# Patient Record
Sex: FEMALE | Race: WHITE | NOT HISPANIC OR LATINO | URBAN - NONMETROPOLITAN AREA
[De-identification: names, ages, dates, MRNs, and addresses within clinical notes are randomized per-mention and may not be internally consistent; named-entity substitution may affect disease eponyms.]

---

## 2017-01-05 ENCOUNTER — COMMUNICATION - GICH (OUTPATIENT)
Dept: FAMILY MEDICINE | Facility: OTHER | Age: 57
End: 2017-01-05

## 2017-01-06 ENCOUNTER — OFFICE VISIT - GICH (OUTPATIENT)
Dept: FAMILY MEDICINE | Facility: OTHER | Age: 57
End: 2017-01-06

## 2017-01-06 ENCOUNTER — HISTORY (OUTPATIENT)
Dept: FAMILY MEDICINE | Facility: OTHER | Age: 57
End: 2017-01-06

## 2017-01-06 DIAGNOSIS — Z11.3 ENCOUNTER FOR SCREENING FOR INFECTIONS WITH PREDOMINANTLY SEXUAL MODE OF TRANSMISSION: ICD-10-CM

## 2017-01-06 DIAGNOSIS — E55.9 VITAMIN D DEFICIENCY: ICD-10-CM

## 2017-01-06 DIAGNOSIS — R19.7 DIARRHEA: ICD-10-CM

## 2017-01-06 DIAGNOSIS — N95.0 POSTMENOPAUSAL BLEEDING: ICD-10-CM

## 2017-01-06 DIAGNOSIS — Z00.00 ENCOUNTER FOR GENERAL ADULT MEDICAL EXAMINATION WITHOUT ABNORMAL FINDINGS: ICD-10-CM

## 2017-01-06 DIAGNOSIS — Z12.39 ENCOUNTER FOR OTHER SCREENING FOR MALIGNANT NEOPLASM OF BREAST: ICD-10-CM

## 2017-01-06 DIAGNOSIS — M25.50 PAIN IN JOINT: ICD-10-CM

## 2017-01-06 DIAGNOSIS — E78.5 HYPERLIPIDEMIA: ICD-10-CM

## 2017-01-06 DIAGNOSIS — F32.9 MAJOR DEPRESSIVE DISORDER, SINGLE EPISODE: ICD-10-CM

## 2017-01-06 LAB
C-REACTIVE PROTEIN - HISTORICAL: <1 MG/DL
ERYTHROCYTE [SEDIMENTATION RATE] IN BLOOD: 12 MM/HR
RHEUMATOID FACTOR - HISTORICAL: <14 IU/ML
TSH - HISTORICAL: 1.77 UIU/ML (ref 0.34–5.6)
VITAMIN D TOTAL - HISTORICAL: 24.1 NG/ML

## 2017-01-06 ASSESSMENT — PATIENT HEALTH QUESTIONNAIRE - PHQ9: SUM OF ALL RESPONSES TO PHQ QUESTIONS 1-9: 1

## 2017-01-08 ENCOUNTER — HISTORY (OUTPATIENT)
Dept: FAMILY MEDICINE | Facility: OTHER | Age: 57
End: 2017-01-08

## 2017-01-09 LAB
HBSAG CATEGORY - HISTORICAL: NONREACTIVE
HEPATITIS C ANTIBODY CATEGORY - HISTORICAL: NORMAL
HIV-1/HIV-2 ANTIBODY CATEGORY - HISTORICAL: NORMAL

## 2017-01-10 LAB
ANA INTERPRETATION: NEGATIVE
CCP ANTIBODY,IGG/IGA - HISTORICAL: <4.6 CU
IGA - HISTORICAL: 214 MG/DL (ref 61–356)
LYME SCREEN W/REFLEX WEST BLOT - HISTORICAL: POSITIVE
TREPONEMA PALLIDUM - HISTORICAL: NEGATIVE

## 2017-01-11 LAB — TISSUE TRANSGLUTAMINASE IGA - HISTORICAL: 2 CU

## 2017-01-12 LAB
LYME IGG WESTERN BLOT - HISTORICAL: NORMAL
LYME IGM WESTERN BLOT - HISTORICAL: NORMAL
LYME WESTERN BLOT INTERP IGG - HISTORICAL: NORMAL
LYME WESTERN BLOT INTERP IGM - HISTORICAL: NORMAL

## 2017-01-16 ENCOUNTER — COMMUNICATION - GICH (OUTPATIENT)
Dept: FAMILY MEDICINE | Facility: OTHER | Age: 57
End: 2017-01-16

## 2017-01-20 ENCOUNTER — HISTORY (OUTPATIENT)
Dept: RADIOLOGY | Facility: OTHER | Age: 57
End: 2017-01-20

## 2017-01-20 ENCOUNTER — AMBULATORY - GICH (OUTPATIENT)
Dept: LAB | Facility: OTHER | Age: 57
End: 2017-01-20

## 2017-01-20 ENCOUNTER — HOSPITAL ENCOUNTER (OUTPATIENT)
Dept: RADIOLOGY | Facility: OTHER | Age: 57
End: 2017-01-20
Attending: FAMILY MEDICINE

## 2017-01-20 DIAGNOSIS — Z12.39 ENCOUNTER FOR OTHER SCREENING FOR MALIGNANT NEOPLASM OF BREAST: ICD-10-CM

## 2017-01-20 DIAGNOSIS — N95.0 POSTMENOPAUSAL BLEEDING: ICD-10-CM

## 2017-01-20 DIAGNOSIS — R19.7 DIARRHEA: ICD-10-CM

## 2017-01-21 LAB
CAMPYLOBACTER EIA - HISTORICAL: NEGATIVE
SHIGA TOXIN 1 - HISTORICAL: NEGATIVE
SHIGA TOXIN 2 - HISTORICAL: NEGATIVE

## 2017-01-23 ENCOUNTER — AMBULATORY - GICH (OUTPATIENT)
Dept: FAMILY MEDICINE | Facility: OTHER | Age: 57
End: 2017-01-23

## 2017-01-23 ENCOUNTER — COMMUNICATION - GICH (OUTPATIENT)
Dept: FAMILY MEDICINE | Facility: OTHER | Age: 57
End: 2017-01-23

## 2017-01-23 DIAGNOSIS — N95.0 POSTMENOPAUSAL BLEEDING: ICD-10-CM

## 2017-01-23 DIAGNOSIS — N84.0 POLYP OF CORPUS UTERI: ICD-10-CM

## 2017-01-24 LAB
METHOD O&P - HISTORICAL: NORMAL
OVA/PARASITE EXAM - HISTORICAL: NORMAL

## 2017-01-27 ENCOUNTER — COMMUNICATION - GICH (OUTPATIENT)
Dept: FAMILY MEDICINE | Facility: OTHER | Age: 57
End: 2017-01-27

## 2018-01-02 NOTE — TELEPHONE ENCOUNTER
Patient Information     Patient Name MRAmber Clements [id number] Female 1960      Telephone Encounter by Dedra Mcgill at 2017 11:54 AM     Author:  Dedra Mcgill Service:  (none) Author Type:  (none)     Filed:  2017 11:55 AM Encounter Date:  2017 Status:  Signed     :  Dedra Mcgill            Per Valencia Minor MD appointment notes, she does not do labs before patients are seen.  Please notify the patient of this.  Dedra Mcgill [facility] ([facility])................ 2017 11:55 AM

## 2018-01-02 NOTE — ADDENDUM NOTE
Patient Information     Patient Name MRN Amber Vernon [id number] Female 1960      Addendum Note by Valencia Rucker MD at 2017 12:03 PM     Author:  Valencia Rucker MD Service:  (none) Author Type:  Physician     Filed:  2017 12:03 PM Encounter Date:  2017 Status:  Signed     :  Valencia Rucker MD (Physician)       Addended by: VALENCIA RUCKER on: 2017 12:03 PM        Modules accepted: Orders

## 2018-01-02 NOTE — PROGRESS NOTES
Patient Information     Patient Name MRN Sex Amber Torres [id number] Female 1960      Progress Notes by Valencia Minor MD at 2017  2:15 PM     Author:  Valencia Minor MD Service:  (none) Author Type:  Physician     Filed:  2017  1:16 PM Encounter Date:  2017 Status:  Signed     :  Valencia Minor MD (Physician)            SUBJECTIVE:    Amber Aparicio is a 56 y.o. female who presents for a physical and to reestablish care.    [place] then served in the peace [employer] in [employer] [facility] [facility] and [facility] [employer] [facility], then caregiving for her parents in the [facility].  Now has moved back to the area in May.    Has been having intestinal issues.  Was in [place] last fall.  Had alternating constipation and diarrhea.  Gas smells very strong.  Had been on some antibiotics for traveler's diarrhea while she was over there.  She was treated for a parasite in , which resolved.  Wonders if dairy or wheat related.      Has been having some joint pain and tingling in hands.  Some swelling at night as well.  No neck pain.  No swelling of joints or digits of hands.    Had some labs done through her employer recently.  Her lipids were noted to be high.  Exercises regularly.  Tries to eat pretty healthy.    She would like to have sexually transmitted infection screening today.  Her Pap smear is up to date.    HPI  I personally reviewed medications/allergies/history listed below:     No Known Allergies,   Family History       Problem   Relation Age of Onset     Heart Disease  Mother      valvular heart disease       Kidney disease  Mother      Heart Disease  Father      valvular heart disease and ischemic heart disease/valve replacement       Heart Disease  Brother      cardiomyopathy       Kidney disease  Brother      Good Health  Daughter      Good Health  Son      Hypertension  Mother      Alcohol/Drug  Father      Alcohol/Drug  Brother      Other  Brother      back issues        Psychiatric illness  Brother      depression       Psychiatric illness  Sister      depression       Heart Disease  Maternal Grandfather      Asthma  Son      Psychiatric illness  Son      depression       Asthma  Daughter      Psychiatric illness  Daughter      depression       Heart Disease  Maternal Uncle      Diabetes  Maternal Uncle      Heart Disease  Brother      hypertrophic cardiomyopathy,      ,   Current Outpatient Prescriptions on File Prior to Visit       Medication  Sig Dispense Refill     cyclobenzaprine (FLEXERIL) 10 mg tablet Take 1 tablet by mouth 3 times daily if needed for Muscle Spasm. 120 tablet 0     metroNIDAZOLE 0.75% vaginal (METROGEL VAGINAL) 0.75 % vaginal gel Insert 1 Applicatorful into the vagina at bedtime. 1 Tube 0     rizatriptan (MAXALT) 10 mg tablet Take 1 tablet by mouth every 2 hours if needed for Migraine. 12 tablet 1     sertraline (ZOLOFT) 100 mg tablet Take 1.5 tablets by mouth once daily. Needs 1 year supply, traveling [place] and [place] the [place] 550 tablet 0     No current facility-administered medications on file prior to visit.    ,   Past Medical History      Diagnosis   Date     Cervical dysplasia       LGSIL pap, colp negative      Diverticulosis       Other adjustment reaction with predominant disturbance of other emotions       Palpitations  2009     see cardiology consult       Palpitations       Unspecified asthma(493.90)       resolved     ,   Patient Active Problem List     Diagnosis  Code     Adjustment disorder with anxiety F43.22     Palpitations R00.2     Hyperlipidemia E78.5    and   Past Surgical History      Procedure  Laterality Date     Pr  delivery only       Vaginal delivery       Pr subtalar athroereisis       Colposcopy  2007     Social History     Social History        Marital status:       Spouse name: N/A     Number of children:  N/A     Years of education:  N/A     Occupational History   "    Not on file.     Social History Main Topics          Smoking status:   Passive Smoke Exposure - Never Smoker      Smokeless tobacco:   Never Used       Comment: In childhood home       Alcohol use   0.6 oz/week     1 Glasses of wine per week        Comment: infrequent        Drug use:   No      Sexual activity:   Yes      Partners:  Male      Birth control/ protection:  Post-menopausal, Condom      Other Topics   Concern      Service  No     Blood Transfusions  No     Caffeine Concern  No     Occupational Exposure  No     Hobby Hazards  No     Sleep Concern  No     Stress Concern  No     Weight Concern  No     Special Diet  No     Back Care  Yes     off and on back and neck pain, chiropractic care and PT      Exercise  Yes     4-5 days per week, walking and yoga, bike      Bike Helmet  Yes     Seat Belt  Yes     Self-Exams  Yes     Social History Narrative     , has current boyfriend.    Denies domestic violence concerns.    Served [employer] in [facility] [employer] [facility] and [employer] [facility].    Daughter lives with her.    Son lives in the [facility].        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                REVIEW OF SYSTEMS:  Review of Systems   All other systems reviewed and are negative.      OBJECTIVE:  /74  Temp 97.4  F (36.3  C) (Tympanic)  Ht 1.594 m (5' 2.75\")  Wt 54.2 kg (119 lb 6.4 oz)  LMP 01/23/2010  Breastfeeding? No  BMI 21.32 kg/m2    EXAM:   Physical Exam   Constitutional: She is oriented to person, place, and time and well-developed, well-nourished, and in no distress.   HENT:   Head: Normocephalic and atraumatic.   Right Ear: External ear normal.   Left Ear: External ear normal.   Nose: Nose normal.   Mouth/Throat: Oropharynx is clear and moist.   Eyes: Pupils are equal, round, and reactive to light. No scleral icterus.   Neck: Normal range of motion. Neck supple. No thyromegaly present. "   Cardiovascular: Normal rate, regular rhythm, normal heart sounds and intact distal pulses.  Exam reveals no gallop and no friction rub.    No murmur heard.  Pulmonary/Chest: Effort normal and breath sounds normal. No respiratory distress. She has no wheezes. She has no rales. She exhibits no tenderness.   Breast exam:  No masses palpable.  No skin changes, tethering or axillary lymphadenopathy.   Abdominal: Soft. Bowel sounds are normal.   Genitourinary: Vagina normal, uterus normal, cervix normal, right adnexa normal and left adnexa normal.   Genitourinary Comments: Pap deferred.  Wet prep & GC chlamydia completed.  Rectal exam showed normal sphincter tone without masses palpable.   Musculoskeletal: Normal range of motion. She exhibits no edema.   Lymphadenopathy:     She has no cervical adenopathy.   Neurological: She is alert and oriented to person, place, and time. She has normal reflexes. No cranial nerve deficit.   Tinel's and Phalen's negative.   Skin: Skin is warm and dry.   Psychiatric: Affect normal.   PHQ Depression Screen  Date of PHQ exam: 17  Over the last 2 weeks, how often have you been bothered by any of the following problems?  1. Little interest or pleasure in doing things: 0 - Not at all  2. Feeling down, depressed, or hopeless: 0 - Not at all  3. Trouble falling or staying asleep, or sleeping too much: 1 - Several days  4. Feeling tired or having little energy: 0 - Not at all  5. Poor appetite or overeatin - Not at all  6. Feeling bad about yourself - or that you are a failure or have let yourself or your family down: 0 - Not at all  7. Trouble concentrating on things, such as reading the newspaper or watching television: 0 - Not at all  8. Moving or speaking so slowly that other people could have noticed. Or the opposite - being so fidgety or restless that you have been moving around a lot more than usual: 0 - Not at all  9. Thoughts that you would be better off dead, or of hurting  yourself in some way: 0 - Not at all    PHQ-9 TOTAL SCORE: 1  Depression Severity Level: none  If any answers were positive, how difficult have these problems made it for you to do your work, take care of things at home, or get along with other people: not difficult at all             ASSESSMENT/PLAN:    ICD-10-CM    1. Health care maintenance Z00.00    2. Breast cancer screening Z12.39 XR MAMMO BILAT SCREENING   3. Screen for STD (sexually transmitted disease) Z11.3 GC CHLAMYDIA TRACH PROBE      ANTI HCV      HBSAG (HBS)      TREPONEMA PALLIDUM      WET PREP GENITAL      ANTI HIV 1/2      ANTI HCV      HBSAG (HBS)      TREPONEMA PALLIDUM      ANTI HIV 1/2      GC CHLAMYDIA TRACH PROBE      WET PREP GENITAL   4. Postmenopausal bleeding N95.0 US PELVIS COMPLETE TA AND TV   5. Diarrhea, unspecified type R19.7 STOOL CULTURE PANEL      CLOSTRIDIUM DIFFICILE TOXIN PCR      OVA / PARASITE EXAM      CRYPTOSPORIDIUM TEST      WBC LACTOFERRIN GIH FLH NAGA ONLY      TSH      CELIAC CASCADE PANEL      TSH      CELIAC CASCADE PANEL   6. Arthralgia, unspecified joint M25.50 RA QUANTITATIVE      CHRIST TEST      C-REACTIVE PROTEIN      SEDIMENTATION RATE      CYCLIC CITRULLINE PEPTIDE      LYME SCREEN W/REFLEX      RA QUANTITATIVE      CHRIST TEST      C-REACTIVE PROTEIN      SEDIMENTATION RATE      CYCLIC CITRULLINE PEPTIDE      LYME SCREEN W/REFLEX   7. Depression, unspecified depression type F32.9 sertraline (ZOLOFT) 100 mg tablet      VITAMIN D 25 (DEFICIENCY)      VITAMIN D 25 (DEFICIENCY)   8. Hyperlipidemia, unspecified hyperlipidemia type E78.5         Plan:    1.  Mammogram ordered.  Pap smear is up to date.  Colonoscopy is up to date.  Vaccines up to date.  2.  Mammogram as above.  3. Sexually transmitted infection screening completed today as above.  4.  Will obtain ultrasound of pelvis.  Consider embx based on results.  5.  Stool studies and other labs as above.  If work up normal, would recommend colonoscopy.  6.  Labs as  above.  7.  Refilled zoloft.  Check vitamin D level.  8.  She is going to try to eat a little healthier and would like to recheck lipids in about 6 months.  Valencia Minor MD ....................  1/8/2017   1:14 PM

## 2018-01-03 NOTE — TELEPHONE ENCOUNTER
Patient Information     Patient Name MRAmber Clements [id number] Female 1960      Telephone Encounter by Dedra Mcgill at 2017  3:01 PM     Author:  Dedra Mcgill Service:  (none) Author Type:  (none)     Filed:  2017  3:03 PM Encounter Date:  2017 Status:  Signed     :  Dedra Mcgill            Left message to call back.  Dedra Mcgill CMA ([facility])................ 2017 3:03 PM

## 2018-01-03 NOTE — TELEPHONE ENCOUNTER
Patient Information     Patient Name MRN Amber Vernon [id number] Female 1960      Telephone Encounter by Adina Fish at 2017  3:12 PM     Author:  Adina Fish Service:  (none) Author Type:  (none)     Filed:  2017  3:13 PM Encounter Date:  2017 Status:  Signed     :  Adina Fihs            Patient was notified that the notes were faxed and the Images were pushed.  She will call to schedule her appointment.  Adina Fish ....................  2017   3:13 PM

## 2018-01-03 NOTE — TELEPHONE ENCOUNTER
Patient Information     Patient Name MRN Amber Vernon [id number] Female 1960      Telephone Encounter by Dedra Mcgill at 2017 11:00 AM     Author:  Dedra Mcgill Service:  (none) Author Type:  (none)     Filed:  2017 11:01 AM Encounter Date:  2017 Status:  Signed     :  Dedra Mcgill            This patient was called by reception and labs were done after her physical.  Dedra Mcgill CMA ([facility])................ 2017 11:01 AM

## 2018-01-03 NOTE — TELEPHONE ENCOUNTER
Patient Information     Patient Name MRN Amber Vernon [id number] Female 1960      Telephone Encounter by Isidra Chatman at 2017 12:20 PM     Author:  Isidra Chatman Service:  (none) Author Type:  (none)     Filed:  2017 12:22 PM Encounter Date:  2017 Status:  Signed     :  Isidra Chatman            Patient returned call and was given information below. Patient was also wondering if Valencia Minor MD could help her fill out a POLST.   Isidra Chatman LPN 2017 12:22 PM

## 2018-01-03 NOTE — TELEPHONE ENCOUNTER
Patient Information     Patient Name Amber Brown [id number] Female 1960      Telephone Encounter by Dedra Mcgill at 2017  9:16 AM     Author:  Dedra Mcgill Service:  (none) Author Type:  (none)     Filed:  2017  9:17 AM Encounter Date:  2017 Status:  Signed     :  Dedra Mcgill            After birth date was verified, patient was read her lab letter from 17.  She had a couple questions about the Lyme tests that I answered for her.  She does not think she wants to take the doxycyline at this time.  She will think about it.  Dedra Mcgill CMA ([facility])................ 2017 9:17 AM

## 2018-01-03 NOTE — TELEPHONE ENCOUNTER
Patient Information     Patient Name MRAmber Clements [id number] Female 1960      Telephone Encounter by Clarita Rodriguez at 2017  3:21 PM     Author:  Clarita Rodriguez Service:  (none) Author Type:  (none)     Filed:  2017  3:21 PM Encounter Date:  2017 Status:  Signed     :  Clarita Rodriguez            Patient returned nurse's call. Please call back when possible.    Clarita Rodriguez ....................  2017   3:21 PM

## 2018-01-03 NOTE — TELEPHONE ENCOUNTER
Patient Information     Patient Name MRN Amber Vernon [id number] Female 1960      Telephone Encounter by Selma Fisher at 2017 12:48 PM     Author:  Selma Fisher Service:  (none) Author Type:  (none)     Filed:  2017 12:49 PM Encounter Date:  2017 Status:  Signed     :  Selma Fisher            Patient states had a polst for both parents and was wanting it added to her 5 wishes. Patient will drop one off possibly or discuss in a follow up appointment.  Selma Fisher LPN .............2017  12:49 PM

## 2018-01-03 NOTE — TELEPHONE ENCOUNTER
Patient Information     Patient Name MRAmber Clements [id number] Female 1960      Telephone Encounter by Selma Fisher at 2017 10:58 AM     Author:  Selma Fisher Service:  (none) Author Type:  (none)     Filed:  2017 10:58 AM Encounter Date:  2017 Status:  Signed     :  Selma Fisher            ----- Message from Valencia Minor MD sent at 2017 10:43 AM CST -----  Please call - her pelvic ultrasound showed that the thickness of the lining of her uterus is normal.  However, there are possibly polyps seen.  Radiology recommended that she have an endometrial biopsy.  Polyps are benign, but sometimes need to be removed to help eliminate post-menopausal bleeding.  Would recommend referral to GYN for further discussion.  Order has been placed.  Valencia Minor MD ....................  2017   10:43 AM

## 2018-01-03 NOTE — TELEPHONE ENCOUNTER
Patient Information     Patient Name Amber Brown [id number] Female 1960      Telephone Encounter by Negra Mondragon at 2017 12:52 PM     Author:  Negra Mondragon Service:  (none) Author Type:  (none)     Filed:  2017 12:53 PM Encounter Date:  2017 Status:  Signed     :  Negra Mondragon            Could you please send the referral and lab results to Dr. Moyer's office in [place] and then let Amber know when this is completed so she can make an appointment there. Thank you.    Negra Mondragon LPN............................... 2017 12:53 PM

## 2018-01-03 NOTE — TELEPHONE ENCOUNTER
Patient Information     Patient Name MRHOWIE Sex Amber Torres [id number] Female 1960      Telephone Encounter by Negra Mondragon at 2017  9:51 AM     Author:  Negra Mondragon Service:  (none) Author Type:  (none)     Filed:  2017  9:53 AM Encounter Date:  2017 Status:  Signed     :  Negra Mondragon            Amber is calling because she would like a referral to see Dr. Natasha Atkins at [facility] in [place] for OBGYN. She states she is unhappy with the process here at [facility] and she is uncomfortable with a male doctor. Please place referral.  Negra Mondragon LPN............................... 2017 9:53 AM

## 2018-01-03 NOTE — TELEPHONE ENCOUNTER
Patient Information     Patient Name MRN Amber Vernon [id number] Female 1960      Telephone Encounter by Valencia Minor MD at 2017 12:27 PM     Author:  Valencia Minor MD Service:  (none) Author Type:  Physician     Filed:  2017 12:28 PM Encounter Date:  2017 Status:  Signed     :  Valencia Minor MD (Physician)            Ok to refer her to Dr. Moyer in [place].  The referral had already been placed for GYN.  Ok to schedule in [place] instead.  Valencia Minor MD ....................  2017   12:28 PM

## 2018-01-03 NOTE — TELEPHONE ENCOUNTER
Patient Information     Patient Name MRAmber Clements [id number] Female 1960      Telephone Encounter by Valencia Minor MD at 2017 12:30 PM     Author:  Valencia Minor MD Service:  (none) Author Type:  Physician     Filed:  2017 12:33 PM Encounter Date:  2017 Status:  Signed     :  Valencia Minor MD (Physician)            What kind of help does she need with a POLST?  Usually these are the DNR/DNI forms we fill out for patients who are in nursing homes.  Does she mean a living will?  Valencia Minor MD ....................  2017   12:31 PM

## 2018-01-03 NOTE — PROGRESS NOTES
Patient Information     Patient Name MRN Amber Vernon [id number] Female 1960      Progress Notes by Neena Bejarano R.T. ([facility]) at 2017  3:18 PM     Author:  [facility] Service:  (none) Author Type:  [employer] - Registered Radiologic Technologist     Filed:  2017  3:18 PM Date of Service:  2017  3:18 PM Status:  Signed     :  Neena Bejarano R.T. (ARRT) ([facility] - Registered Radiologic Technologist)            Falls Risk Criteria:    Age 65 and older or under age 4        Sensory deficits    Poor vision    Use of ambulatory aides    Impaired judgment    Unable to walk independently    Meets High Risk criteria for falls:  no

## 2018-01-03 NOTE — PROGRESS NOTES
Patient Information     Patient Name MRN Sex Amber Torres [id number] Female 1960      Progress Notes by Ran[facility] at 2017  3:08 PM     Author:  Ranjana Prabhakar R.T. ([facility]RT) Service:  (none) Author Type:  (none)     Filed:  2017  3:08 PM Date of Service:  2017  3:08 PM Status:  Signed     :  Ranjana Prabhakar R.T. (JUNET) ([facility] - Registered Radiologic Technologist)            Falls Risk Criteria:    Age 65 and older or under age 4        Sensory deficits    Poor vision    Use of ambulatory aides    Impaired judgment    Unable to walk independently    Meets High Risk criteria for falls:  no

## 2018-01-03 NOTE — TELEPHONE ENCOUNTER
Patient Information     Patient Name MRAmber Clements [id number] Female 1960      Telephone Encounter by Selma Fisher at 2017 10:58 AM     Author:  Selma Fisher Service:  (none) Author Type:  (none)     Filed:  2017 10:59 AM Encounter Date:  2017 Status:  Signed     :  Selma Fisher            Left message to call back.  Selma Fisher LPN ....................  2017   10:58 AM

## 2018-01-16 PROBLEM — E78.5 HYPERLIPIDEMIA: Status: ACTIVE | Noted: 2017-01-08

## 2018-01-16 RX ORDER — CYCLOBENZAPRINE HCL 10 MG
10 TABLET ORAL
COMMUNITY
Start: 2010-08-17

## 2018-01-16 RX ORDER — METRONIDAZOLE 7.5 MG/G
1 GEL VAGINAL
COMMUNITY
Start: 2011-09-30 | End: 2024-01-19

## 2018-01-16 RX ORDER — RIZATRIPTAN BENZOATE 10 MG/1
10 TABLET ORAL
COMMUNITY
Start: 2010-03-04

## 2018-01-16 RX ORDER — UBIDECARENONE 50 MG
1 CAPSULE ORAL
COMMUNITY
Start: 2017-01-02 | End: 2024-01-19

## 2018-01-16 RX ORDER — SERTRALINE HYDROCHLORIDE 100 MG/1
150 TABLET, FILM COATED ORAL
COMMUNITY
Start: 2010-08-06 | End: 2024-01-19

## 2018-01-25 VITALS
SYSTOLIC BLOOD PRESSURE: 114 MMHG | HEIGHT: 63 IN | WEIGHT: 119.4 LBS | TEMPERATURE: 97.4 F | BODY MASS INDEX: 21.16 KG/M2 | DIASTOLIC BLOOD PRESSURE: 74 MMHG

## 2018-01-27 ASSESSMENT — PATIENT HEALTH QUESTIONNAIRE - PHQ9: SUM OF ALL RESPONSES TO PHQ QUESTIONS 1-9: 1

## 2018-01-29 ENCOUNTER — COMMUNICATION - GICH (OUTPATIENT)
Dept: FAMILY MEDICINE | Facility: OTHER | Age: 58
End: 2018-01-29

## 2018-01-29 DIAGNOSIS — F32.9 MAJOR DEPRESSIVE DISORDER, SINGLE EPISODE: ICD-10-CM

## 2018-02-13 NOTE — TELEPHONE ENCOUNTER
Patient Information     Patient Name MRN Amber Vernon [id number] Female 1960      Telephone Encounter by Valencia Minor MD at 2018 11:40 AM     Author:  Valencia Minor MD Service:  (none) Author Type:  Physician     Filed:  2018 11:41 AM Encounter Date:  2018 Status:  Signed     :  Valencia Minor MD (Physician)            zoloft refilled.  Valencia Minor MD ....................  2018   11:40 AM

## 2018-02-13 NOTE — TELEPHONE ENCOUNTER
Patient Information     Patient Name MRN Amber Vernon [id number] Female 1960      Telephone Encounter by Farnaz Paul RN at 2018 10:50 AM     Author:  Farnaz Paul RN Service:  (none) Author Type:  NURS- Registered Nurse     Filed:  2018 10:59 AM Encounter Date:  2018 Status:  Signed     :  Farnaz Paul RN (NURS- Registered Nurse)            This is a Refill request from: [facility]   Name of Medication: Sertraline (ZOLOFT) 100 mg tablet  Quantity requested: 135   Last fill date: 2017  Due for refill: yes   Last visit with CLAUDIO RUCKER was on: 2017. Reminder letter sent.  Controlled Substance Agreement:  na   Diagnosis r/t this medication request: Depression     Unable to complete prescription refill per RN Medication Refill Policy.................... Farnaz Paul RN ....................  2018   10:52 AM

## 2018-03-24 ENCOUNTER — HEALTH MAINTENANCE LETTER (OUTPATIENT)
Age: 58
End: 2018-03-24

## 2018-07-23 NOTE — PROGRESS NOTES
"Patient Information     Patient Name  Amber Aparicio MRN  [id number] Sex  Female   1960      Letter by Valencia Minor MD at      Author:  Valencia Minor MD Service:  (none) Author Type:  (none)    Filed:   Encounter Date:  2017 Status:  (Other)           Amber Aparicio  [address] [facility] [address]          2017    Dear Ms. Aparicio:    Welcome to [facility]!   Remember to activate your account quickly -  Your activation code expires in 45 days!    With [facility], you can view your health information, email your provider, schedule clinic appointments, get after visit instructions and more - online, anytime.     To activate your [facility] account, you will need:    * to visit [email or web address]  * your [facility] activation code: [email or web address]  Expires: 2017  2:41 PM   * the last four digits of your Social Security number (SSN)   * your date of birth.     Step-by-step instructions on how to set up your [facility] account are shown on the following page. If you requested access to your child/dependent s [facility] account or you have been granted access to another adult s [facility] account, instructions for viewing their information are also on the following page.     For questions or technical assistance, call [phone].    Thank you for choosing [facility] activation instructions     Activation code: [email or web address]  Expires: 2017  2:41 PM     Step 1: Go to [email or web address].     Step 2: Click on Enter your activation code.     Step 3: Type in your activation code (provided above), the last four digits of your Social Security number (SSN) and date of birth. This information is only required once. The next time you sign in to your account you will only need your username and password. If you receive an error that states \"Invalid Social Security number  or  Invalid date of birth\" that means the information we have on file does not match the " information entered. Please call [phone] for assistance.     Step 4: Choose a username that is easy for you to remember, but hard for others to guess. Your username must:   * be between five and 24 characters   * contain only letters and numbers (no symbols).   Once selected, your username cannot be changed.     Step 5: Choose a password. Your password must:   * be at least eight characters   * contain at least one uppercase letter   * contain one lowercase letter   * contain one number or symbol   * be different than your username.     Step 6: Choose a security question. This will allow you to reset your password.     Step 7: Enter the answer to your security question. The answer cannot be the same as your password.     Step 8: Enter your email address. You will receive email notifications when new information is available in [facility]. You are now ready to start using [facility]!     If you requested proxy access for a child s or another adult s [facility] account, you will be able to access their health record by clicking on their name    Instructions for Child/Dependent Access  To view your child s record, click on your child's name under your name on the right hand side of the screen.   Instructions for Adult Proxy Access  To view your adult proxy record, click on the adult's name under your name on the right hand side of the screen.    In the event you have technical difficulties, please call   [facility] at [phone].

## 2018-07-23 NOTE — PROGRESS NOTES
Patient Information     Patient Name  Amber Aparicio MRN  [id number] Sex  Female   1960      Letter by Valencia Minor MD at      Author:  Valencia Minor MD Service:  (none) Author Type:  (none)    Filed:   Encounter Date:  2018 Status:  (Other)           Amber Aparicio   [address]  [facility] [address]          2018    Dear Ms. Aparicio:    This is to remind you that you are due for your annual office visit with Valencia Minor MD. Your last visit was on 2017.     Additional refills of your medication require you to complete this visit.    Please call [phone] to schedule your appointment.    Thank you for choosing [facility] for your health care needs.    Sincerely,      Refill RN  [facility]

## 2018-07-24 NOTE — PROGRESS NOTES
Patient Information     Patient Name  Amber Aparicio MRN  [id number] Sex  Female   1960      Letter by Valencia Minor MD at      Author:  Valencia Minor MD Service:  (none) Author Type:  (none)    Filed:   Encounter Date:  2017 Status:  (Other)           Amber Aparicio  [address] [facility] [address]          2017    Dear Ms. Aparicio:    I wanted to let you know about your recent labs.  Your Rheumatoid Factor & CCP (screen for rheumatoid arthritis) were both normal.  Your Anti-nuclear antibody (screens for autoimmune diseases) was normal. Your CRP and ESR, which are both markers for inflammation, were both normal.      Your screening test for celiac disease (IGA & tissue transglutaminase) was normal.    The Hepatitis B, Hepatitis C, HIV, Treponema Pallidum (syphilis), Gonorrhea/chlamydia, wet prep were all normal.      Your TSH (thyroid) was normal.    Your vitamin D level was slightly low.  I would recommend taking over the counter Vitamin D 5000 International Units daily.  Consider having this level rechecked again in 3 months.    Your Lyme test initially was positive, but the Western Blot to confirm whether you truly have Lyme disease was negative.  To err on the side of caution, I would recommend taking doxycycline 100 mg twice daily x 3 weeks.  I have sent a prescription for this to your pharmacy.  If you have questions, please call [phone].      Sincerely,      Valencia Minor MD       Resulted Orders      RA QUANTITATIVE (Collected: 2017  3:49 PM)      Result  Value Ref Range    RHEUMATOID FACTOR QUANT. <14.0 <14.0 IU/mL    Narrative      The presence of RF in patient serum should be  considered as one criterion of rheumatoid disease,  and not as a definitive diagnosis.  Results  >14 IU/mL are considered positive for RF.     ANTI HCV (Collected: 2017  3:49 PM)      Result  Value Ref Range    HEPATITIS C ANTIBODY Non-Reactive Non-Reactive    Narrative       Antibodies to HCV not detected; does not exclude the possibility of exposure to HCV.     HBSAG (HBS) (Collected: 1/6/2017  3:49 PM)      Result  Value Ref Range    HBSAG Nonreactive Nonreactive   TREPONEMA PALLIDUM (Collected: 1/6/2017  3:49 PM)      Result  Value Ref Range    TREPONEMA PALLIDUM Negative Negative   ANTI HIV 1/2 (Collected: 1/6/2017  3:49 PM)      Result  Value Ref Range    HIV-1/HIV-2 ANTIBODY Non-Reactive Non-Reactive    Narrative      HIV-1 p24 and HIV-1/HIV-2 Ab not detected     VITAMIN D 25 (DEFICIENCY) (Collected: 1/6/2017  3:49 PM)      Result  Value Ref Range    VITAMIN D TOTAL AFL 24.1   ng/mL    Narrative      Deficiency:           <10 ng/mL  Insufficiency:      10-29 ng/mL    Sufficiency:        ng/mL    Possible Toxicity:   >100 ng/mL       CHRIST TEST (Collected: 1/6/2017  3:49 PM)      Result  Value Ref Range    ANTINUCLEAR ANTIBODY (CHRIST),SCREEN Negative Negative   C-REACTIVE PROTEIN (Collected: 1/6/2017  3:49 PM)      Result  Value Ref Range    C-REACTIVE PROTEIN <1.000 <1.000 mg/dL   SEDIMENTATION RATE (Collected: 1/6/2017  3:49 PM)      Result  Value Ref Range    SEDIMENTATION RATE        12 <31 mm/hr   CYCLIC CITRULLINE PEPTIDE (Collected: 1/6/2017  3:51 PM)      Result  Value Ref Range    CCP Antibody,IgG/IgA <4.6 <=19.9 CU    Narrative      Negative <20  Positive >=20  The following results were obtained with the [employer] QUANTA Flash CCP3 chemiluminescent immunoassay. Values obtained with different manufacturers' assay methods may not be used interchangeably.       LYME SCREEN W/REFLEX (Collected: 1/6/2017  3:49 PM)      Result  Value Ref Range    LYME SCREEN W/REFLEX WEST BLOT Positive (A) Negative   TSH (Collected: 1/6/2017  3:49 PM)      Result  Value Ref Range    TSH 1.77 0.34 - 5.60 uIU/mL   CELIAC CASCADE PANEL (Collected: 1/6/2017  3:49 PM)      Result  Value Ref Range    .00 61.00 - 356.00 mg/dL    Narrative      Reflexed to Tissue Transglutaminase IgA     GC  CHLAMYDIA TRACH PROBE (Collected: 1/6/2017  3:48 PM)      Result  Value Ref Range    CHLAMYDIA PCR NOT Detected NOT Detected, Invalid    N GONORRHOEAE PCR NOT Detected NOT Detected, Invalid   WET PREP GENITAL (Collected: 1/6/2017  3:48 PM)      Result  Value Ref Range    TRICHOMONAS               None Seen None Seen    YEAST                     None Seen None Seen    CLUE CELLS                None Seen None Seen   TISSUE TRANSGLUTAMINASE IGA (Collected: 1/6/2017  3:49 PM)      Result  Value Ref Range    TISSUE TRANSGLUTAMINASE IGA 2.0 <=19.9 CU    Narrative      Negative        <20  Weak Positive   20-30  Positive        >30.0     This test should not be solely relied upon to establish a diagnosis of celiac disease. Affected individuals who have been on a gluten-free diet prior to testing may have a negative result. These results were obtained using the [employer] Quanta Flash h-tTG IgA chemiluminescent immunoassay.  Values obtained from other manufacturers' assay methods may not be used interchangeably.     LYME DISEASE GAIL CONF (Collected: 1/6/2017  3:49 PM)      Result  Value Ref Range    IGG WESTERN BLOT No Bands Negative    IGM WESTERN BLOT No Bands Negative    WESTERN BLOT INTERP IGG Negative, no bands present Negative    WESTERN BLOT INTERP IGM Negative, no bands present Negative

## 2023-11-20 ENCOUNTER — TELEPHONE (OUTPATIENT)
Dept: FAMILY MEDICINE | Facility: OTHER | Age: 63
End: 2023-11-20

## 2023-11-20 NOTE — TELEPHONE ENCOUNTER
Reason for Call:  Appointment Request    Patient requesting this type of appt:  Establish Care    Requested provider:  Dr. Joseph    Reason patient unable to be scheduled:       When does patient want to be seen/preferred time:       Comments: Dr. Joseph Pt states you used to see her parents- Edwin and Halle Lim    Could we send this information to you in [facility] or would you prefer to receive a phone call?:   Patient would prefer a phone call   Okay to leave a detailed message?: Yes at Home number on file [phone] (home)    Call taken on 11/20/2023 at 3:02 PM by Teresa Dia

## 2023-11-21 NOTE — TELEPHONE ENCOUNTER
Patient states that she requires a provider visit with Dr. Joseph to review testing and medical history from the past couple months. Patient states that she has seen many specialists in the past couple months, including ENT, pulmonology, GI and cardiology.     Patient expresses surprise that soonest available appt is in 01/2024, writer advised that Dr. Joseph is not taking new patients at this time as she has very limited availability. Patient expressed verbal understanding and requested soonest available appt, appt made:  1/22/2024 7:00 AM (Arrive by 6:45 AM) Maurice Joseph MD [facility]     Claudia Lares RN  -[facility]

## 2024-01-07 ENCOUNTER — HEALTH MAINTENANCE LETTER (OUTPATIENT)
Age: 64
End: 2024-01-07

## 2024-01-19 ENCOUNTER — OFFICE VISIT (OUTPATIENT)
Dept: FAMILY MEDICINE | Facility: CLINIC | Age: 64
End: 2024-01-19
Payer: COMMERCIAL

## 2024-01-19 VITALS
RESPIRATION RATE: 16 BRPM | SYSTOLIC BLOOD PRESSURE: 101 MMHG | DIASTOLIC BLOOD PRESSURE: 65 MMHG | WEIGHT: 147 LBS | BODY MASS INDEX: 27.05 KG/M2 | HEART RATE: 67 BPM | TEMPERATURE: 98.4 F | OXYGEN SATURATION: 97 % | HEIGHT: 62 IN

## 2024-01-19 DIAGNOSIS — E55.9 VITAMIN D DEFICIENCY: ICD-10-CM

## 2024-01-19 DIAGNOSIS — K21.00 GASTROESOPHAGEAL REFLUX DISEASE WITH ESOPHAGITIS WITHOUT HEMORRHAGE: ICD-10-CM

## 2024-01-19 DIAGNOSIS — R42 DIZZINESS: ICD-10-CM

## 2024-01-19 DIAGNOSIS — E04.1 THYROID NODULE: ICD-10-CM

## 2024-01-19 DIAGNOSIS — Z12.4 CERVICAL CANCER SCREENING: ICD-10-CM

## 2024-01-19 DIAGNOSIS — R06.02 SHORTNESS OF BREATH: ICD-10-CM

## 2024-01-19 DIAGNOSIS — Z00.00 ROUTINE GENERAL MEDICAL EXAMINATION AT A HEALTH CARE FACILITY: Primary | ICD-10-CM

## 2024-01-19 DIAGNOSIS — M81.0 AGE-RELATED OSTEOPOROSIS WITHOUT CURRENT PATHOLOGICAL FRACTURE: ICD-10-CM

## 2024-01-19 DIAGNOSIS — F43.22 ADJUSTMENT DISORDER WITH ANXIETY: ICD-10-CM

## 2024-01-19 DIAGNOSIS — D70.8 OTHER NEUTROPENIA (H): ICD-10-CM

## 2024-01-19 DIAGNOSIS — R00.2 PALPITATIONS: ICD-10-CM

## 2024-01-19 DIAGNOSIS — E78.5 HYPERLIPIDEMIA LDL GOAL <100: ICD-10-CM

## 2024-01-19 DIAGNOSIS — S32.19XD: ICD-10-CM

## 2024-01-19 LAB
ERYTHROCYTE [DISTWIDTH] IN BLOOD BY AUTOMATED COUNT: 12.4 % (ref 10–15)
HCT VFR BLD AUTO: 38.1 % (ref 35–47)
HGB BLD-MCNC: 12 G/DL (ref 11.7–15.7)
MCH RBC QN AUTO: 29.6 PG (ref 26.5–33)
MCHC RBC AUTO-ENTMCNC: 31.5 G/DL (ref 31.5–36.5)
MCV RBC AUTO: 94 FL (ref 78–100)
PLATELET # BLD AUTO: 243 10E3/UL (ref 150–450)
RBC # BLD AUTO: 4.05 10E6/UL (ref 3.8–5.2)
WBC # BLD AUTO: 2.8 10E3/UL (ref 4–11)

## 2024-01-19 PROCEDURE — 87624 HPV HI-RISK TYP POOLED RSLT: CPT | Performed by: INTERNAL MEDICINE

## 2024-01-19 PROCEDURE — 36415 COLL VENOUS BLD VENIPUNCTURE: CPT | Performed by: INTERNAL MEDICINE

## 2024-01-19 PROCEDURE — G0145 SCR C/V CYTO,THINLAYER,RESCR: HCPCS | Performed by: INTERNAL MEDICINE

## 2024-01-19 PROCEDURE — 82306 VITAMIN D 25 HYDROXY: CPT | Performed by: INTERNAL MEDICINE

## 2024-01-19 PROCEDURE — 99214 OFFICE O/P EST MOD 30 MIN: CPT | Mod: 25 | Performed by: INTERNAL MEDICINE

## 2024-01-19 PROCEDURE — 80053 COMPREHEN METABOLIC PANEL: CPT | Performed by: INTERNAL MEDICINE

## 2024-01-19 PROCEDURE — 99386 PREV VISIT NEW AGE 40-64: CPT | Performed by: INTERNAL MEDICINE

## 2024-01-19 PROCEDURE — 84443 ASSAY THYROID STIM HORMONE: CPT | Performed by: INTERNAL MEDICINE

## 2024-01-19 PROCEDURE — 80061 LIPID PANEL: CPT | Performed by: INTERNAL MEDICINE

## 2024-01-19 PROCEDURE — 85027 COMPLETE CBC AUTOMATED: CPT | Performed by: INTERNAL MEDICINE

## 2024-01-19 RX ORDER — LEUCOVORIN, FOLIC ACID, LEVOMEFOLATE MAGNESIUM, FERROUS CYSTEINE GLYCINATE, 1,2-DOCOSAHEXANOYL-SN-GLYCERO-3-PHOSPHOSERINE CALCIUM, 1,2-ICOSAPENTOYL-SN-GLYCERO-3-PHOSPHOSERINE CALCIUM, PHOSPHATIDYL SERINE, PYRIDOXAL 5-PHOSPHATE, FLAVIN ADENINE DINUCLEOTIDE, NADH, COBAMAMIDE, COCARBOXYLASE (THIAMINE PYROPHOSPHATE), MAGNESIUM ASCORBATE, ZINC ASCORBATE, MAGNESIUM L-THREONATE AND BETAINE 2.5; 1; 7; 13.6; 6.4; 800; 12; 25; 25; 25; 50; 25; 24; 1; 1; 500; 1.83; 3.67 MG/1; MG/1; MG/1; MG/1; MG/1; UG/1; MG/1; UG/1; UG/1; UG/1; UG/1; UG/1; MG/1; MG/1; MG/1; UG/1; MG/1; MG/1
CAPSULE, DELAYED RELEASE PELLETS ORAL
COMMUNITY
Start: 2022-07-21

## 2024-01-19 RX ORDER — DESVENLAFAXINE 50 MG/1
50 TABLET, FILM COATED, EXTENDED RELEASE ORAL
COMMUNITY
Start: 2023-10-11

## 2024-01-19 RX ORDER — ALENDRONATE SODIUM 70 MG/1
70 TABLET ORAL
COMMUNITY
Start: 2023-12-21

## 2024-01-19 RX ORDER — PANTOPRAZOLE SODIUM 40 MG/1
40 TABLET, DELAYED RELEASE ORAL
COMMUNITY
Start: 2023-12-21

## 2024-01-19 NOTE — PROGRESS NOTES
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 as well as [facility] and has partners    Patient had bunion surgery this year in 2023 in March  She was given NSAIDs which caused some GERD  Now she is on 40 twice a day Protonix but GERD has been persistent  She does not have chest pain or shortness of breath at exercise but at rest she does have shortness of breath  She has been fully worked up including echo, spirometry, CT chest, coronary angiogram  All normal  Endoscopy normal he has  Thyroid nodule and lung nodules which are benign  She has some chronic anxiety with depression and is on Pristiq  She has been dizzy for months which is different than her vertigo     1. Normal left ventricular chamber size and systolic function. Estimated left ventricular   ejection fraction is 55%.    2. No regional wall motion abnormalities. Normal left ventricular wall thickness.    3. Normal right ventricular size and systolic function.    4. Mild mitral valve regurgitation.    5. Normal inferior vena cava with normal inspiratory collapse.     * Using ultrasound guidance and a percutaneous technique, the right radial artery was accessed. Ultrasound was used to confirm vessel patency, localizing needle into the lumen of the vessel. An image was saved for the medical record.   DIAGNOSTIC - CORONARY   * Right dominant coronary artery system.   * Normal appearing coronary arteries.   * Hemodynamic assessment was performed in the LAD: RFR was 0.90. Baseline FFR was 0.94 and hyperemic FFR was 0.93 indicative of no significant epicardial coronary issues. Normal coronary microvascular function with CFR 4.6 and IMR 15.   HEMODYNAMICS   * The LVEDP  is moderately elevated 25-30 mmHg. No significant catheter pullback gradient across the aortic valve.     FINDINGS: 5 lumbar vertebral bodies. No abnormal cord signal.     Acute/subacute fracture of S3. Bony edema extends into the sacral ala bilaterally which are incompletely visualized.      Inferior endplate Schmorl's node at L4. Mild/moderate degenerative disc disease and facet arthritis greatest between L4-S1. Trace degenerative endplate edema at L5-S1.     L1-L2: No stenosis.   L2-L3: No stenosis.   L3-L4: Broad-based posterior disc bulge. Mild spinal canal narrowing.   L4-L5: Broad-based posterior disc bulge. Moderate right and mild left neuroforaminal narrowing.   L5-S1: Interspace narrowing. Posterior disc bulge. Moderate right and mild left neuroforaminal narrowing.     IMPRESSION:   1. Acute/subacute fracture of S3. This fracture may involve the sacral ala bilaterally.   2.  Moderate neuroforaminal narrowing at the right L4-5 and right L5-S1 foramen.   3.  Mild spinal canal narrowing L3-4.     ASSESSMENT:   The T-score in the L1-L4 vertebral bodies is -1.9 which is consistent with osteopenia and was previously -1.6.     The T-score in the left femoral neck is -1.1 which is consistent with osteopenia and was previously -1.0.     The T-score in the right femoral neck is -1.6 which is consistent with osteopenia and was previously -1.3.       The 10-year risk of a major osteoporotic fracture is 14.2% and of a hip fracture is 1.5%.     Healthy Habits:     Taking medications regularly:  0  History of Present Illness       Reason for visit:  Bone health, leakage, wrist swelling,  Symptom onset:  More than a month    She eats 4 or more servings of fruits and vegetables daily.She consumes 0 sweetened beverage(s) daily.She exercises with enough effort to increase her heart rate 20 to 29 minutes per day.  She exercises with enough effort to increase her heart rate 3 or less days per week.   She is taking medications  regularly.      Today's PHQ-2 Score:       2024     9:17 AM   PHQ-2 (  [employer])   Q1: Little interest or pleasure in doing things 0   Q2: Feeling down, depressed or hopeless 0   PHQ-2 Score 0   Q1: Little interest or pleasure in doing things Not at all   Q2: Feeling down, depressed or hopeless Not at all   PHQ-2 Score 0       Via the Health Maintenance questionnaire, the patient has reported the following services have been completed -Mammogram, this information has been sent to the abstraction team.          Have you ever done Advance Care Planning? (For example, a Health Directive, POLST, or a discussion with a medical provider or your loved ones about your wishes): Yes, patient states has an Advance Care Planning document and will bring a copy to the clinic.    Social History     Tobacco Use    Smoking status: Never     Passive exposure: Past    Smokeless tobacco: Never    Tobacco comments:     Quit smoking: In childhood home   Substance Use Topics    Alcohol use: Yes     Alcohol/week: 2.0 standard drinks of alcohol     Types: 2 Standard drinks or equivalent per week             2024     9:32 AM   Alcohol Use   Prescreen: >3 drinks/day or >7 drinks/week? No          No data to display              Reviewed orders with patient.  Reviewed health maintenance and updated orders accordingly -   BP Readings from Last 3 Encounters:   24 101/65   17 114/74   08 104/64    Wt Readings from Last 3 Encounters:   24 66.7 kg (147 lb)   17 54.2 kg (119 lb 6.4 oz)   08 63.3 kg (139 lb 9.6 oz)                  Patient Active Problem List   Diagnosis    Adjustment disorder with anxiety    Hyperlipidemia    Palpitations     Past Surgical History:   Procedure Laterality Date    COLPOSCOPY, BIOPSY, COMBINED  2012    OTHER SURGICAL HISTORY      1985,81638.0,MA  DELIVERY ONLY    OTHER SURGICAL HISTORY      [id number],VAGINAL DELIVERY    OTHER SURGICAL HISTORY       2003,14489.9,[place] SUBTBOBO JAVIER       Social History     Tobacco Use    Smoking status: Never     Passive exposure: Past    Smokeless tobacco: Never    Tobacco comments:     Quit smoking: In childhood home   Substance Use Topics    Alcohol use: Yes     Alcohol/week: 2.0 standard drinks of alcohol     Types: 2 Standard drinks or equivalent per week     Family History   Problem Relation Age of Onset    Heart Disease Mother         Heart Disease,valvular heart disease    Kidney Disease Mother         Kidney disease    Hypertension Mother         Hypertension    Heart Disease Father         Heart Disease,valvular heart disease and ischemic heart disease/valve replacement    Substance Abuse Father         Alcohol/Drug    Heart Disease Brother         Heart Disease,cardiomyopathy    Kidney Disease Brother         Kidney disease    Heart Disease Maternal Grandfather         Heart Disease    Obesity Daughter         Good Health    Obesity Son         Good Health    Heart Disease Maternal Uncle         Heart Disease    Diabetes Maternal Uncle         Diabetes    Breast Cancer No family hx of         Cancer-breast         Current Outpatient Medications   Medication Sig Dispense Refill    alendronate (FOSAMAX) 70 MG tablet Take 70 mg by mouth      co-enzyme Q-10 30 MG CAPS capsule Take 60 mg by mouth      cyclobenzaprine (FLEXERIL) 10 MG tablet Take 10 mg by mouth      desvenlafaxine (PRISTIQ) 50 MG 24 hr tablet Take 50 mg by mouth      EnLyte (ENLYTE) CAPS capsule Take by mouth once daily.      pantoprazole (PROTONIX) 40 MG EC tablet Take 40 mg by mouth      rizatriptan (MAXALT) 10 MG tablet Take 10 mg by mouth at onset of headache for migraine       Allergies   Allergen Reactions    Sulfa Antibiotics Hives and Rash    Magnesium Salicylate      Headaches        Breast Cancer Screenin/19/2024     9:20 AM   Breast CA Risk Assessment (FHS-7)   Do you have a family history of breast, colon, or ovarian cancer? No  "/ Unknown         Mammogram Screening: Recommended annual mammography  Pertinent mammograms are reviewed under the imaging tab.    History of abnormal Pap smear: Last 3 Pap and HPV Results:     She did have abnormal mammogram  Reviewed and updated as needed this visit by clinical staff   Tobacco  Allergies  Meds  Problems     Soc Hx        Reviewed and updated as needed this visit by Provider     Meds  Problems               Review of Systems  10 point ROS of systems including Constitutional, Eyes, Respiratory, Cardiovascular, Gastroenterology, Genitourinary, Integumentary, Muscularskeletal, Psychiatric were all negative except for pertinent positives noted in my HPI.      OBJECTIVE:   /65 (BP Location: Left arm, Patient Position: Sitting, Cuff Size: Adult Regular)   Pulse 67   Temp 98.4  F (36.9  C)   Resp 16   Ht 1.583 m (5' 2.32\")   Wt 66.7 kg (147 lb)   SpO2 97%   BMI 26.61 kg/m     Estimated body mass index is 26.61 kg/m  as calculated from the following:    Height as of this encounter: 1.583 m (5' 2.32\").    Weight as of this encounter: 66.7 kg (147 lb).  Physical Exam  GENERAL: alert and no distress  EYES: Eyes grossly normal to inspection, PERRL and conjunctivae and sclerae normal  HENT: ear canals and TM's normal, nose and mouth without ulcers or lesions  NECK: no adenopathy, no asymmetry, masses, or scars  RESP: lungs clear to auscultation - no rales, rhonchi or wheezes  CV: regular rate and rhythm, normal S1 S2, no S3 or S4, no murmur, click or rub, no peripheral edema  ABDOMEN: soft, nontender, no hepatosplenomegaly, no masses and bowel sounds normal   (female): normal female external genitalia, normal urethral meatus, vaginal mucosa pink, moist, well rugated  MS: no gross musculoskeletal defects noted, no edema  SKIN: Dry skin and benign moles no suspicious lesions or rashes  NEURO: Normal strength and tone, mentation intact and speech normal  PSYCH: mentation appears normal, affect " normal/bright  LYMPH: no cervical, supraclavicular, axillary, or inguinal adenopathy        ASSESSMENT/PLAN:   Routine general medical examination at a health care facility  Very pleasant 63 years old who is new to me and has multiple also has skin issues  She does not want to take vaccines because Lots is going to on  She will start to exercise and has gained weight and BMI is 26.6  We also talked about Kegel's exercises and vaginal estrogens  Gastroesophageal reflux disease with esophagitis without hemorrhage  On Protonix twice daily but I suspect esophageal dysmotility  - CBC with Platelets    - REVIEW OF HEALTH MAINTENANCE PROTOCOL ORDERS  - Adult GI  Referral - Consult Only  - CBC with Platelets      Dizziness  She will do vertigo rehab and we may be able to cut down on medications  - REVIEW OF HEALTH MAINTENANCE PROTOCOL ORDERS  - Physical Therapy Referral    Shortness of breath  All the workup including spirometry, echo, coronary angiogram everything reviewed  - REVIEW OF HEALTH MAINTENANCE PROTOCOL ORDERS    Adjustment disorder with anxiety  Continue Pristiq  - REVIEW OF HEALTH MAINTENANCE PROTOCOL ORDERS    Hyperlipidemia LDL goal <100    - Comprehensive metabolic panel  - Lipid panel reflex to direct LDL Fasting  - REVIEW OF HEALTH MAINTENANCE PROTOCOL ORDERS  - Comprehensive metabolic panel  - Lipid panel reflex to direct LDL Fasting    Palpitations  Has been worked up  - REVIEW OF HEALTH MAINTENANCE PROTOCOL ORDERS    Thyroid nodule  Normal thyroid and ultrasound done somewhere which I cannot download from [email or web address]  - TSH with free T4 reflex  - REVIEW OF HEALTH MAINTENANCE PROTOCOL ORDERS  - TSH with free T4 reflex    Vitamin D deficiency    - Vitamin D Deficiency  - Vitamin D Deficiency    Cervical cancer screening had abnormal Pap in the past    - Pap Screen with HPV - recommended age 30 - 65 years    Osteoporosis with sacral fracture  Findings are noticed and she will start Reclast  infusion  She cannot use oral bisphosphonates because of severe GERD and esophageal issues    Counseling  Reviewed preventive health counseling, as reflected in patient instructions       Regular exercise       Healthy diet/nutrition        She reports that she has never smoked. She has been exposed to tobacco smoke. She has never used smokeless tobacco.        Signed Electronically by: Maurice Joseph MD

## 2024-01-20 LAB
ALBUMIN SERPL BCG-MCNC: 4.6 G/DL (ref 3.5–5.2)
ALP SERPL-CCNC: 77 U/L (ref 40–150)
ALT SERPL W P-5'-P-CCNC: 22 U/L (ref 0–50)
ANION GAP SERPL CALCULATED.3IONS-SCNC: 10 MMOL/L (ref 7–15)
AST SERPL W P-5'-P-CCNC: 24 U/L (ref 0–45)
BILIRUB SERPL-MCNC: 0.2 MG/DL
BUN SERPL-MCNC: 15.5 MG/DL (ref 8–23)
CALCIUM SERPL-MCNC: 9.5 MG/DL (ref 8.8–10.2)
CHLORIDE SERPL-SCNC: 104 MMOL/L (ref 98–107)
CHOLEST SERPL-MCNC: 256 MG/DL
CREAT SERPL-MCNC: 0.78 MG/DL (ref 0.51–0.95)
DEPRECATED HCO3 PLAS-SCNC: 28 MMOL/L (ref 22–29)
EGFRCR SERPLBLD CKD-EPI 2021: 85 ML/MIN/1.73M2
FASTING STATUS PATIENT QL REPORTED: NO
GLUCOSE SERPL-MCNC: 97 MG/DL (ref 70–99)
HDLC SERPL-MCNC: 62 MG/DL
LDLC SERPL CALC-MCNC: 179 MG/DL
NONHDLC SERPL-MCNC: 194 MG/DL
POTASSIUM SERPL-SCNC: 4 MMOL/L (ref 3.4–5.3)
PROT SERPL-MCNC: 7.4 G/DL (ref 6.4–8.3)
SODIUM SERPL-SCNC: 142 MMOL/L (ref 135–145)
TRIGL SERPL-MCNC: 77 MG/DL
TSH SERPL DL<=0.005 MIU/L-ACNC: 2.56 UIU/ML (ref 0.3–4.2)
VIT D+METAB SERPL-MCNC: 36 NG/ML (ref 20–50)

## 2024-01-22 ENCOUNTER — TELEPHONE (OUTPATIENT)
Dept: FAMILY MEDICINE | Facility: CLINIC | Age: 64
End: 2024-01-22

## 2024-01-22 ENCOUNTER — PATIENT OUTREACH (OUTPATIENT)
Dept: ONCOLOGY | Facility: CLINIC | Age: 64
End: 2024-01-22

## 2024-01-22 NOTE — TELEPHONE ENCOUNTER
Patient calling about reclast infusion. Nurse unable to locate the order for reclast. Please place order, then can contact infusion.       BRIAN Coombs  [facility]

## 2024-01-22 NOTE — PROGRESS NOTES
Hematology referral reviewed for [facility], see below.    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    Plan: Triage instructions updated and sent to NPS for completion.

## 2024-01-23 LAB
BKR LAB AP GYN ADEQUACY: NORMAL
BKR LAB AP GYN INTERPRETATION: NORMAL
BKR LAB AP HPV REFLEX: NORMAL
BKR LAB AP PREVIOUS ABNL DX: NORMAL
BKR LAB AP PREVIOUS ABNORMAL: NORMAL
PATH REPORT.COMMENTS IMP SPEC: NORMAL
PATH REPORT.COMMENTS IMP SPEC: NORMAL
PATH REPORT.RELEVANT HX SPEC: NORMAL

## 2024-01-23 NOTE — TELEPHONE ENCOUNTER
Patient requesting Infusion order to be faxed to a clinic closer to her home.     [facility]  Hematology/Oncology Dept.  Fax: [phone]      Patient requesting a call back when referral is faxed so she may schedule her appointments.       Can we leave a detailed message on this number? YES  Phone number patient can be reached at: Home number on file [phone] (home)    Ciara Blackwood RN  [facility] Triage

## 2024-01-24 ENCOUNTER — PATIENT OUTREACH (OUTPATIENT)
Dept: FAMILY MEDICINE | Facility: CLINIC | Age: 64
End: 2024-01-24
Payer: COMMERCIAL

## 2024-01-24 PROBLEM — R87.810 CERVICAL HIGH RISK HPV (HUMAN PAPILLOMAVIRUS) TEST POSITIVE: Status: ACTIVE | Noted: 2024-01-24

## 2024-01-24 LAB
HUMAN PAPILLOMA VIRUS 16 DNA: NEGATIVE
HUMAN PAPILLOMA VIRUS 18 DNA: NEGATIVE
HUMAN PAPILLOMA VIRUS FINAL DIAGNOSIS: ABNORMAL
HUMAN PAPILLOMA VIRUS OTHER HR: POSITIVE

## 2024-01-24 NOTE — TELEPHONE ENCOUNTER
"Routing back to PCP. This order is placed by the provider.     Per Dr. Benson:    Great question re: Reclast! This order is placed by the provider - I've done it many times. I don't believe RNs can order it. It's under the \"More Activities\" down arrow (to the right of chart review and the other tabs), select the option \"Rarely Used,\" then select \"Admit/Therapy.\" They will then go down the \"Therapy Plan\" and type in \"Reclast\" and select the option they'd like. They will then need to associate the diagnoses and sign off on the order set. This will prompt the infusion center to call the patient and schedule. Voila!   "

## 2024-01-24 NOTE — TELEPHONE ENCOUNTER
Patient Contact    Attempt # 1    Was call answered? No.    Left message for patient to call triage back.  Please let patient know order was placed and to call to schedule.    Nancy Umaña RN

## 2024-01-25 NOTE — TELEPHONE ENCOUNTER
Patient Contact    Attempt # 2    Was call answered?  No.  Left message on voicemail with information to call back.    Jessy MOREAU, Triage RN  [facility]

## 2024-01-29 NOTE — TELEPHONE ENCOUNTER
Called and spoke with pt, relayed message below. Pt verbalized understanding and in agreement with plan.  MARIA GUADALUPE FOLEY RN on 1/29/2024 at 10:21 AM

## 2024-01-29 NOTE — TELEPHONE ENCOUNTER
Patient Contact    Attempt # 3    Was call answered?  No.  Left message on voicemail with information to call back.    Jessy MOREAU, Triage RN  [facility]

## 2025-01-02 ENCOUNTER — PATIENT OUTREACH (OUTPATIENT)
Dept: FAMILY MEDICINE | Facility: CLINIC | Age: 65
End: 2025-01-02
Payer: COMMERCIAL

## 2025-01-02 DIAGNOSIS — R87.810 CERVICAL HIGH RISK HPV (HUMAN PAPILLOMAVIRUS) TEST POSITIVE: Primary | ICD-10-CM

## 2025-02-22 ENCOUNTER — HEALTH MAINTENANCE LETTER (OUTPATIENT)
Age: 65
End: 2025-02-22

## 2025-05-24 ENCOUNTER — HEALTH MAINTENANCE LETTER (OUTPATIENT)
Age: 65
End: 2025-05-24